# Patient Record
Sex: FEMALE | Race: WHITE | NOT HISPANIC OR LATINO | URBAN - METROPOLITAN AREA
[De-identification: names, ages, dates, MRNs, and addresses within clinical notes are randomized per-mention and may not be internally consistent; named-entity substitution may affect disease eponyms.]

---

## 2018-10-22 ENCOUNTER — TELEPHONE (OUTPATIENT)
Dept: TRANSPLANT | Facility: CLINIC | Age: 23
End: 2018-10-22

## 2018-10-22 NOTE — TELEPHONE ENCOUNTER
"[facility] B[facility]ZE  [id number]      LIVING KIDNEY DONOR EVALUATION  Donor First Name Michelle Donor MRN    Donor Last Name Dale Completed 10/17/2018 1:24 AM    1995 Record ID [id number]   BREEZE Screen PASSED     Intended Recipient  Recipient First Name ALTRUISTIC Recipient MRN    Recipient Last Name ALTRUISTIC Relationship N/A   Recipient   Recipient Diagnosis    Recipient's ABO      Donor Information  Age 23 Gender Female   Ht 155 cm (5' 1'') Race    Wt 70.3 kg (155 lbs) Ethnicity /   BMI 29.30 kg/m  Preferred Language English      Required No     Blood Type O   Demographics  Home Address [facility] [address] [place] [address] # [phone]   [facility] Type [facility] #    Zip Code [address] Type    Country [place] Preferred Contact day Fri   Email [email or web address] Preferred Contact time 1:00 PM-4:00 PM   &&   Donor's Medical Information  Medical History Asthma ( no Hospitalizations, no Intubations, no Steroid Use )   Depression   Dysfunctional Uterine Bleeding   other (\"To suplement the fact that I don t drink milk \") Medications Nexplanon   Advair Diskus Inhaler   Albuterol Inhaler   Fish Oil   Garcinia Cambogia   Vitamin C   Vitamin D   Surgical History None Reported Allergies NKDA   Social History EtOH: Rare (1-2 drinks/month)   Illicit Drug Use: Denies   Tobacco: Denies Self-Reported Functional Status \"I am able to participate in moderate recreational activities like golf, double tennis, dancing, throwing a baseball or football\"   Family Medical History Cancer (denies)   Diabetes (denies)   Heart Disease (denies)   Hypertension (Aunt or Uncle)   Kidney Disease (denies)   Kidney Stones (denies) Exercise Frequency Exercise (Not on a regular basis)   Review of Organ Systems  Review of Systems Airway or Lungs: Yes   Blood Disorder: No   Cancer: No   Diabetes,Thyroid,Adrenal,Endocrine Disorder: No   Digestive or Liver: No   Female Health: Yes   Heart or " Circulatory System: No   Immune Diseases: No   Kidneys and Bladder: No   Muscles,Bones,Joints: No   Neuro: No   Psych: Yes   &&   Donor's Social Information  Marital Status Single Living Accommodation Lives in rented accommodation   Level of Education Some college education Living Arrangement Alone   Employment Status Unemployed Concerns: health and life insurance Yes   Employer  Concerns: job security and lost income Yes   Occupation      Medical Insurance Status Has medical insurance     High Risk Behavior  High Risk Behaviors Blood transfusion < 12 months. (NO)   Commercial sex < 12 months. (NO)   Illicit IV drug use < 5yrs. (NO)   Other high risk sexual contact < 12 months. (NO)   Reason for Donation  Referral Social Media Reason for Donation If I don t need both my kindneys to live a healthy life and I am able to help somoen else why not do it.   Permission to Disclose Inquiry No Patient Comments    Donor Motivation Level Unsure, has questions     PCP Contact  PCP Name    PCP [facility]    PCP State    PCP Phone    Emergency Contact  First Name Nhi First Name    Last Name Thole Last Name    Phone # [phone] Phone #    Phone Type Mobile Phone Type    Relationship Mother Relationship    Office Use  Reviewed By    Reviewed 10/17/2018 8:47 AM   Admin Folder Accept   Comments 10/17 - Passed Eval   Lost for Followup    Extended Comments    BREEZE ID [id number] survey status completed   Activity History  Call  Task    Due Date 10/19/2018   Last Modified Date/Time 10/19/2018 1:15 PM   Comments

## 2018-10-23 DIAGNOSIS — Z00.5 TRANSPLANT DONOR EVALUATION: Primary | ICD-10-CM

## 2021-08-25 LAB — PAP SMEAR - HIM PATIENT REPORTED: NEGATIVE

## 2022-02-18 ENCOUNTER — HOSPITAL ENCOUNTER (EMERGENCY)
Facility: CLINIC | Age: 27
End: 2022-02-18

## 2022-11-02 ENCOUNTER — TRANSFERRED RECORDS (OUTPATIENT)
Dept: HEALTH INFORMATION MANAGEMENT | Facility: CLINIC | Age: 27
End: 2022-11-02

## 2022-11-02 LAB — PHQ9 SCORE: 22

## 2022-11-16 ENCOUNTER — TRANSFERRED RECORDS (OUTPATIENT)
Dept: HEALTH INFORMATION MANAGEMENT | Facility: CLINIC | Age: 27
End: 2022-11-16

## 2022-11-16 LAB — PHQ9 SCORE: 12
